# Patient Record
Sex: MALE | Race: WHITE | NOT HISPANIC OR LATINO | Employment: OTHER | URBAN - METROPOLITAN AREA
[De-identification: names, ages, dates, MRNs, and addresses within clinical notes are randomized per-mention and may not be internally consistent; named-entity substitution may affect disease eponyms.]

---

## 2021-07-05 ENCOUNTER — HOSPITAL ENCOUNTER (EMERGENCY)
Facility: CLINIC | Age: 86
Discharge: HOME | End: 2021-07-05
Attending: EMERGENCY MEDICINE | Admitting: EMERGENCY MEDICINE
Payer: MEDICARE

## 2021-07-05 ENCOUNTER — APPOINTMENT (OUTPATIENT)
Dept: GENERAL RADIOLOGY | Facility: CLINIC | Age: 86
End: 2021-07-05
Attending: EMERGENCY MEDICINE
Payer: MEDICARE

## 2021-07-05 VITALS
RESPIRATION RATE: 16 BRPM | TEMPERATURE: 98.5 F | SYSTOLIC BLOOD PRESSURE: 117 MMHG | WEIGHT: 129 LBS | DIASTOLIC BLOOD PRESSURE: 61 MMHG | OXYGEN SATURATION: 98 % | HEART RATE: 61 BPM

## 2021-07-05 DIAGNOSIS — J18.9 COMMUNITY ACQUIRED PNEUMONIA OF LEFT LOWER LOBE OF LUNG: ICD-10-CM

## 2021-07-05 LAB
ANION GAP SERPL CALCULATED.3IONS-SCNC: 3 MMOL/L (ref 3–14)
BASOPHILS # BLD AUTO: 0.1 10E9/L (ref 0–0.2)
BASOPHILS NFR BLD AUTO: 0.4 %
BUN SERPL-MCNC: 23 MG/DL (ref 7–30)
CALCIUM SERPL-MCNC: 8.7 MG/DL (ref 8.5–10.1)
CHLORIDE SERPL-SCNC: 95 MMOL/L (ref 94–109)
CO2 SERPL-SCNC: 34 MMOL/L (ref 20–32)
CREAT SERPL-MCNC: 1.19 MG/DL (ref 0.66–1.25)
DIFFERENTIAL METHOD BLD: ABNORMAL
EOSINOPHIL # BLD AUTO: 0 10E9/L (ref 0–0.7)
EOSINOPHIL NFR BLD AUTO: 0.3 %
ERYTHROCYTE [DISTWIDTH] IN BLOOD BY AUTOMATED COUNT: 16.6 % (ref 10–15)
GFR SERPL CREATININE-BSD FRML MDRD: 55 ML/MIN/{1.73_M2}
GLUCOSE SERPL-MCNC: 84 MG/DL (ref 70–99)
HCT VFR BLD AUTO: 38.3 % (ref 40–53)
HGB BLD-MCNC: 11.9 G/DL (ref 13.3–17.7)
IMM GRANULOCYTES # BLD: 0.1 10E9/L (ref 0–0.4)
IMM GRANULOCYTES NFR BLD: 0.8 %
INR PPP: 2.79 (ref 0.86–1.14)
LABORATORY COMMENT REPORT: NORMAL
LYMPHOCYTES # BLD AUTO: 1.6 10E9/L (ref 0.8–5.3)
LYMPHOCYTES NFR BLD AUTO: 12.1 %
MCH RBC QN AUTO: 27.5 PG (ref 26.5–33)
MCHC RBC AUTO-ENTMCNC: 31.1 G/DL (ref 31.5–36.5)
MCV RBC AUTO: 89 FL (ref 78–100)
MONOCYTES # BLD AUTO: 1.1 10E9/L (ref 0–1.3)
MONOCYTES NFR BLD AUTO: 8.7 %
NEUTROPHILS # BLD AUTO: 10.1 10E9/L (ref 1.6–8.3)
NEUTROPHILS NFR BLD AUTO: 77.7 %
NRBC # BLD AUTO: 0 10*3/UL
NRBC BLD AUTO-RTO: 0 /100
NT-PROBNP SERPL-MCNC: 1407 PG/ML (ref 0–1800)
PLATELET # BLD AUTO: 283 10E9/L (ref 150–450)
POTASSIUM SERPL-SCNC: 3.5 MMOL/L (ref 3.4–5.3)
RBC # BLD AUTO: 4.33 10E12/L (ref 4.4–5.9)
SARS-COV-2 RNA RESP QL NAA+PROBE: NEGATIVE
SODIUM SERPL-SCNC: 131 MMOL/L (ref 133–144)
SPECIMEN SOURCE: NORMAL
TROPONIN I SERPL-MCNC: <0.015 UG/L (ref 0–0.04)
WBC # BLD AUTO: 13 10E9/L (ref 4–11)

## 2021-07-05 PROCEDURE — 99285 EMERGENCY DEPT VISIT HI MDM: CPT | Mod: 25

## 2021-07-05 PROCEDURE — 250N000013 HC RX MED GY IP 250 OP 250 PS 637: Performed by: EMERGENCY MEDICINE

## 2021-07-05 PROCEDURE — 84484 ASSAY OF TROPONIN QUANT: CPT | Performed by: EMERGENCY MEDICINE

## 2021-07-05 PROCEDURE — 85610 PROTHROMBIN TIME: CPT | Performed by: EMERGENCY MEDICINE

## 2021-07-05 PROCEDURE — C9803 HOPD COVID-19 SPEC COLLECT: HCPCS

## 2021-07-05 PROCEDURE — 80048 BASIC METABOLIC PNL TOTAL CA: CPT | Performed by: EMERGENCY MEDICINE

## 2021-07-05 PROCEDURE — 93005 ELECTROCARDIOGRAM TRACING: CPT

## 2021-07-05 PROCEDURE — 71046 X-RAY EXAM CHEST 2 VIEWS: CPT

## 2021-07-05 PROCEDURE — 85025 COMPLETE CBC W/AUTO DIFF WBC: CPT | Performed by: EMERGENCY MEDICINE

## 2021-07-05 PROCEDURE — 87635 SARS-COV-2 COVID-19 AMP PRB: CPT | Performed by: EMERGENCY MEDICINE

## 2021-07-05 PROCEDURE — 83880 ASSAY OF NATRIURETIC PEPTIDE: CPT | Performed by: EMERGENCY MEDICINE

## 2021-07-05 PROCEDURE — 83605 ASSAY OF LACTIC ACID: CPT

## 2021-07-05 PROCEDURE — 82803 BLOOD GASES ANY COMBINATION: CPT

## 2021-07-05 RX ORDER — CEFDINIR 300 MG/1
300 CAPSULE ORAL ONCE
Status: COMPLETED | OUTPATIENT
Start: 2021-07-05 | End: 2021-07-05

## 2021-07-05 RX ORDER — DOXYCYCLINE 100 MG/1
100 CAPSULE ORAL ONCE
Status: COMPLETED | OUTPATIENT
Start: 2021-07-05 | End: 2021-07-05

## 2021-07-05 RX ORDER — DOXYCYCLINE 100 MG/1
100 CAPSULE ORAL 2 TIMES DAILY
Qty: 14 CAPSULE | Refills: 0 | Status: SHIPPED | OUTPATIENT
Start: 2021-07-05 | End: 2021-07-12

## 2021-07-05 RX ORDER — AMOXICILLIN AND CLAVULANATE POTASSIUM 562.5; 437.5; 62.5 MG/1; MG/1; MG/1
2 TABLET, MULTILAYER, EXTENDED RELEASE ORAL 2 TIMES DAILY
Qty: 28 TABLET | Refills: 0 | Status: SHIPPED | OUTPATIENT
Start: 2021-07-05 | End: 2021-07-12

## 2021-07-05 RX ADMIN — DOXYCYCLINE HYCLATE 100 MG: 100 CAPSULE ORAL at 11:31

## 2021-07-05 RX ADMIN — CEFDINIR 300 MG: 300 CAPSULE ORAL at 11:31

## 2021-07-05 ASSESSMENT — ENCOUNTER SYMPTOMS
VOMITING: 0
COUGH: 1
SHORTNESS OF BREATH: 0
FEVER: 0

## 2021-07-05 NOTE — ED PROVIDER NOTES
History   Chief Complaint:  Shortness of Breath       HPI     Jose Juan Holman is a 86 year old male with history of COPD who presents with cough and generalized weakness.  Patient had the gradual onset of generalized weakness which began yesterday associated with a nonproductive cough.  Although he reported shortness of breath to triage nurse, he denies shortness of breath or chest pain to me.  He had a subjective fever but no documented fever at home or in the emergency department.  He is concerned that this is a recurrent bout of pneumonia similar to an episode in which he was admitted at [facility].  He otherwise feels well and denies any novel peripheral edema, hemoptysis, dysuria,, urinary frequency, abdominal pain, diarrhea or any other concerns.    Review of Systems   Constitutional: Negative for fever.   Respiratory: Positive for cough. Negative for shortness of breath.    Cardiovascular: Negative for chest pain.   Gastrointestinal: Negative for vomiting.   Skin: Negative for rash.   All other systems reviewed and are negative.      Allergies:  Sulfa drugs    Medications:  Neurontin  Hydrochlorothiazide  Spiriva  Symbicort  Albuterol   Omeppi  Cordarone  Vitamin D3  Lasix  Zaroxolyn  Coumadin  Home oxygen   Prilosec  AKA Surbex-T  Zocor  Pacerone  Potassium chloride    Past Medical History:    Acute respiratory failure with hypoxia  Acute myocardial infarction  Pleural effusion on left  Cellulitis of right lower extremity  Bilateral leg edema  Generalized muscle weakness  Acute on chronic diastolic heart failure  Long term use of anticoagulants  Persistent atrial fibrillation  NSTEMI  Disease of cardiovascular system  Carotid bruit  Neuralgia  Essential hypertension  GERD without esophagitis  Mixed hyperlipidemia  Prostate cancer   COPD  Pneumonia of right lung due to infectious organism    Past Surgical History:    Prostate cryoablation  Laparotomy  Coronary artery bypass graft    Family History:     Parkinsons  Breast cancer    Social History:  Patient presents with significant other.    Physical Exam     Patient Vitals for the past 24 hrs:   BP Temp Pulse Resp SpO2 Weight   07/05/21 1130 117/61 -- -- 16 98 % --   07/05/21 1115 -- -- -- -- 96 % --   07/05/21 1100 -- -- -- -- 97 % --   07/05/21 1045 -- -- -- -- 98 % --   07/05/21 1030 -- -- -- -- 98 % --   07/05/21 1015 -- -- -- -- 97 % --   07/05/21 1000 -- -- -- -- 95 % --   07/05/21 0945 -- -- -- -- 95 % --   07/05/21 0930 -- -- -- -- 94 % --   07/05/21 0915 117/52 -- -- -- 96 % --   07/05/21 0900 -- -- -- -- 95 % --   07/05/21 0845 -- -- -- -- 94 % --   07/05/21 0830 125/65 -- 61 -- (!) 56 % --   07/05/21 0645 122/84 98.5  F (36.9  C) 71 20 93 % 58.5 kg (129 lb)       Physical Exam    Eyes:    Conjunctiva normal  Neck:    Supple, no meningismus.     CV:     Regular rate and rhythm.      No murmurs, rubs or gallops.       No unilateral leg swelling.       2+ radial pulses bilateral.       1+ bilateral lower extremity edema.  PULM:    Clear to auscultation bilateral.       Diminished breath sounds at left base     No respiratory distress.      No rales or wheezing.     No stridor.  ABD:    Soft, non-tender, non-distended.       No pulsatile masses.       No rebound, guarding or rigidity.  MSK:     No gross deformity to all four extremities.   LYMPH:   No cervical lymphadenopathy.  NEURO:   Alert. Good muscle tone, no atrophy.  Skin:    Warm, dry and intact.    Psych:    Mood is good and affect is appropriate.        Emergency Department Course   ECG  ECG taken at 0832, ECG read at 0839  Sinus brachycardia with 1st degree AV block  Rightward axis  T wave abnormality, consider inferior ischemia  Prolonged QT   Rate 57 bpm. RI interval 210 ms. QRS duration 114 ms. QT/QTc 486/473 ms. P-R-T axes 21 90 77.     Imaging:  XR Chest 2 Views  Small left base pleural effusion. Patchy opacity at the   left lung base with pneumonia being a possibility. Atelectasis is also    possible. Emphysematous changes and bronchiectasis suggested.   Age-indeterminate mid thoracic vertebral body height loss on the   lateral image noted.   Per radiology    Laboratory:  CBC: WBC 13.0 (H), HGB 11.9 (L),   BMP:  (L), Carbon dioxide 34 (H), GFR Estimate 55 (L) o/w WNL (Creatinine 1.19)   Symptomatic COVID: Negative  Troponin (Collected 849): <0.015  INR: 2.79 (H)  BNP: 1,407    Emergency Department Course:    Reviewed:  I reviewed nursing notes, vitals, past medical history and care everywhere    Assessments:  08 I obtained history and examined the patient as noted above.     Interventions:  1131 Omnicef 300 mg PO  1131 Vibramycin 100 mg PO    Disposition:  The patient was discharged to home.       Impression & Plan     Medical Decision Makin-year-old male with history of COPD presents to the ED with nonproductive cough and generalized weakness.  He was primarilyy concerned about recurrent pneumonia.  He has no evidence of bronchospasm on exam.  No evidence of ACS equivalent.  Very low suspicion for pulmonary embolism and is therapeutic on INR at this no indication for CT scan of the chest.  No acute anemia or intravascular volume depletion to account for his symptoms.  COVID-19 has been ruled out.  Chest x-ray reveals a small left pleural effusion with questionable left lower lobe pneumonia.  Radiographic changes are not overwhelming but will error on the side of caution and treat with antibiotics for community-acquired pneumonia.  Augmentin and doxycycline utilized due to concomitant use of warfarin.  Patient to follow-up with primary care physician in the next 2 to 3 days and return to the ED for any worsening symptoms.    Covid-19  Jose Juan Holman was evaluated during a global COVID-19 pandemic, which necessitated consideration that the patient might be at risk for infection with the SARS-CoV-2 virus that causes COVID-19.   Applicable protocols for evaluation were followed  during the patient's care.   COVID-19 was considered as part of the patient's evaluation. The plan for testing is:  a test was obtained during this visit.    Diagnosis:    ICD-10-CM    1. Community acquired pneumonia of left lower lobe of lung  J18.9        Discharge Medications:  Discharge Medication List as of 7/5/2021 11:29 AM      START taking these medications    Details   amoxicillin-clavulanate (AUGMENTIN XR) 1000-62.5 MG 12 hr tablet Take 2 tablets by mouth 2 times daily for 7 days, Disp-28 tablet, R-0, E-Prescribe      doxycycline hyclate (VIBRAMYCIN) 100 MG capsule Take 1 capsule (100 mg) by mouth 2 times daily for 7 days, Disp-14 capsule, R-0, E-Prescribe             Scribe Disclosure:  JESSICA ELKINS MEREDITH, am serving as a scribe at 8:23 AM on 7/5/2021 to document services personally performed by Peter Shields MD based on my observations and the provider's statements to me.            Peter Shields MD  07/05/21 1428

## 2021-07-05 NOTE — ED TRIAGE NOTES
Pt aox4, ABCs intact. Pt c/o cough, SOB and fever since yesterday. Pt states that if feels like his prior episodes of pneumonia.

## 2021-07-06 LAB
CO2 BLDCOV-SCNC: 37 MMOL/L (ref 21–28)
INTERPRETATION ECG - MUSE: NORMAL
LACTATE BLD-SCNC: 1.3 MMOL/L (ref 0.7–2.1)
PCO2 BLDV: 55 MM HG (ref 40–50)
PH BLDV: 7.44 PH (ref 7.32–7.43)
PO2 BLDV: <15 MM HG (ref 25–47)

## 2021-09-17 ENCOUNTER — APPOINTMENT (OUTPATIENT)
Dept: GENERAL RADIOLOGY | Facility: CLINIC | Age: 86
End: 2021-09-17
Attending: EMERGENCY MEDICINE
Payer: MEDICARE

## 2021-09-17 ENCOUNTER — APPOINTMENT (OUTPATIENT)
Dept: CT IMAGING | Facility: CLINIC | Age: 86
End: 2021-09-17
Attending: EMERGENCY MEDICINE
Payer: MEDICARE

## 2021-09-17 ENCOUNTER — HOSPITAL ENCOUNTER (EMERGENCY)
Facility: CLINIC | Age: 86
Discharge: HOME | End: 2021-09-17
Attending: EMERGENCY MEDICINE | Admitting: EMERGENCY MEDICINE
Payer: MEDICARE

## 2021-09-17 VITALS
TEMPERATURE: 97.5 F | OXYGEN SATURATION: 97 % | HEART RATE: 50 BPM | DIASTOLIC BLOOD PRESSURE: 51 MMHG | HEIGHT: 69 IN | SYSTOLIC BLOOD PRESSURE: 134 MMHG | BODY MASS INDEX: 19.4 KG/M2 | RESPIRATION RATE: 16 BRPM | WEIGHT: 131 LBS

## 2021-09-17 DIAGNOSIS — S42.034A CLOSED NONDISPLACED FRACTURE OF ACROMIAL END OF RIGHT CLAVICLE, INITIAL ENCOUNTER: ICD-10-CM

## 2021-09-17 DIAGNOSIS — S00.81XA ABRASION OF FOREHEAD, INITIAL ENCOUNTER: ICD-10-CM

## 2021-09-17 DIAGNOSIS — S51.011A SKIN TEAR OF RIGHT ELBOW WITHOUT COMPLICATION, INITIAL ENCOUNTER: ICD-10-CM

## 2021-09-17 LAB
ANION GAP SERPL CALCULATED.3IONS-SCNC: 5 MMOL/L (ref 3–14)
BASOPHILS # BLD AUTO: 0.1 10E3/UL (ref 0–0.2)
BASOPHILS NFR BLD AUTO: 0 %
BUN SERPL-MCNC: 22 MG/DL (ref 7–30)
CALCIUM SERPL-MCNC: 8.5 MG/DL (ref 8.5–10.1)
CHLORIDE BLD-SCNC: 97 MMOL/L (ref 94–109)
CO2 SERPL-SCNC: 33 MMOL/L (ref 20–32)
CREAT SERPL-MCNC: 1.13 MG/DL (ref 0.66–1.25)
EOSINOPHIL # BLD AUTO: 0.1 10E3/UL (ref 0–0.7)
EOSINOPHIL NFR BLD AUTO: 0 %
ERYTHROCYTE [DISTWIDTH] IN BLOOD BY AUTOMATED COUNT: 16.7 % (ref 10–15)
GFR SERPL CREATININE-BSD FRML MDRD: 59 ML/MIN/1.73M2
GLUCOSE BLD-MCNC: 76 MG/DL (ref 70–99)
HCT VFR BLD AUTO: 36.9 % (ref 40–53)
HGB BLD-MCNC: 11.7 G/DL (ref 13.3–17.7)
IMM GRANULOCYTES # BLD: 0.2 10E3/UL
IMM GRANULOCYTES NFR BLD: 1 %
INR PPP: 2.58 (ref 0.85–1.15)
LYMPHOCYTES # BLD AUTO: 1.5 10E3/UL (ref 0.8–5.3)
LYMPHOCYTES NFR BLD AUTO: 9 %
MCH RBC QN AUTO: 28.3 PG (ref 26.5–33)
MCHC RBC AUTO-ENTMCNC: 31.7 G/DL (ref 31.5–36.5)
MCV RBC AUTO: 89 FL (ref 78–100)
MONOCYTES # BLD AUTO: 0.8 10E3/UL (ref 0–1.3)
MONOCYTES NFR BLD AUTO: 5 %
NEUTROPHILS # BLD AUTO: 13.6 10E3/UL (ref 1.6–8.3)
NEUTROPHILS NFR BLD AUTO: 85 %
NRBC # BLD AUTO: 0 10E3/UL
NRBC BLD AUTO-RTO: 0 /100
PLATELET # BLD AUTO: 308 10E3/UL (ref 150–450)
POTASSIUM BLD-SCNC: 3.1 MMOL/L (ref 3.4–5.3)
RBC # BLD AUTO: 4.14 10E6/UL (ref 4.4–5.9)
SODIUM SERPL-SCNC: 135 MMOL/L (ref 133–144)
WBC # BLD AUTO: 16.2 10E3/UL (ref 4–11)

## 2021-09-17 PROCEDURE — 73030 X-RAY EXAM OF SHOULDER: CPT | Mod: RT

## 2021-09-17 PROCEDURE — 23500 CLTX CLAVICULAR FX W/O MNPJ: CPT | Mod: RT

## 2021-09-17 PROCEDURE — 85025 COMPLETE CBC W/AUTO DIFF WBC: CPT | Performed by: EMERGENCY MEDICINE

## 2021-09-17 PROCEDURE — 36415 COLL VENOUS BLD VENIPUNCTURE: CPT | Performed by: EMERGENCY MEDICINE

## 2021-09-17 PROCEDURE — 80048 BASIC METABOLIC PNL TOTAL CA: CPT | Performed by: EMERGENCY MEDICINE

## 2021-09-17 PROCEDURE — 70450 CT HEAD/BRAIN W/O DYE: CPT

## 2021-09-17 PROCEDURE — 85610 PROTHROMBIN TIME: CPT | Performed by: EMERGENCY MEDICINE

## 2021-09-17 PROCEDURE — 99284 EMERGENCY DEPT VISIT MOD MDM: CPT | Mod: 25

## 2021-09-17 ASSESSMENT — MIFFLIN-ST. JEOR: SCORE: 1269.59

## 2021-09-17 ASSESSMENT — ENCOUNTER SYMPTOMS
WOUND: 1
NECK STIFFNESS: 1
DIZZINESS: 0

## 2021-09-17 NOTE — ED PROVIDER NOTES
History   Chief Complaint:  Fall      HPI   Jose Juan Holman is a 86 year old male, currently on coumadin, with a history of NSTEMI, hypertension, hyperlipidemia, and afib, who presents to the ED for evaluation after a fall. The patient reports he fell around 0630 this morning after tripping over his feet on carpet. He fell forward and hit his head. He now complains of right shoulder pain and slight neck soreness. He does have a visible abrasion on the right side of forehead. Denies loss of consciousness and dizziness.     Review of Systems   Musculoskeletal: Positive for neck stiffness.        (+) shoulder pain   Skin: Positive for wound.   Neurological: Negative for dizziness and syncope.   All other systems reviewed and are negative.    Allergies:  Sulfa drugs     Medications:  Neurontin  Hydrochlorothiazide  Spiriva  Symbicort  Albuterol   Omeppi  Cordarone  Vitamin D3  Lasix  Zaroxolyn  Coumadin  Home oxygen   Prilosec  AKA Surbex-T  Zocor  Pacerone  Potassium chloride     Past Medical History:    Acute respiratory failure with hypoxia  Acute myocardial infarction  Pleural effusion on left  Cellulitis of right lower extremity  Bilateral leg edema  Generalized muscle weakness  Acute on chronic diastolic heart failure  Long term use of anticoagulants  Persistent atrial fibrillation  NSTEMI  Disease of cardiovascular system  Carotid bruit  Neuralgia  Essential hypertension  GERD without esophagitis  Mixed hyperlipidemia  Prostate cancer   COPD  Pneumonia of right lung due to infectious organism     Past Surgical History:    Prostate cryoablation  Laparotomy  Coronary artery bypass graft     Family History:    Parkinsons  Breast cancer    Social History:  Marital Status:   PCP: Stephanie Velasco  Presents to the ED with wife    Physical Exam     Patient Vitals for the past 24 hrs:   BP Temp Temp src Pulse Resp SpO2 Height Weight   09/17/21 1245 134/51 -- -- 50 -- 97 % -- --   09/17/21 1230 137/58 -- -- 56 -- 97  "% -- --   09/17/21 1126 (!) 127/92 97.5  F (36.4  C) Temporal 91 16 92 % 1.753 m (5' 9\") 59.4 kg (131 lb)       Physical Exam  Constitutional: Alert, attentive, GCS 15  HENT:    Nose: Nose normal.    Mouth/Throat: Oropharynx is clear, mucous membranes are moist  Eyes: EOM are normal, anicteric, conjugate gaze  CV: regular rate and rhythm; no murmurs  Chest: Effort normal and breath sounds clear without wheezing or rales, symmetric bilaterally   GI:  non tender. No distension. No guarding or rebound.    MSK: Right 4 cm skin tear on elbow no focal bony tenderness. Anterior right shoulder tenderness without crepitus of the right chest, no AC tenderness, range of shoulder full passively.  Neurological: Alert, attentive, moving all extremities equally.   Skin: Skin is warm and dry.  Neck: No midline tenderness. Full range of motion.  Head: Right frontal forehead skin tear.    Emergency Department Course   Imaging:    Shoulder XR, 3 views, Right:  Acute, minimally displaced distal clavicular fracture with   extension into the AC joint.     Bones are demineralized. Mild glenohumeral joint degenerative change.   No dislocation. Prior sternotomy. Emphysema.     CT Head without contrast:  No acute intracranial abnormality.     Reading per radiology    Laboratory:    CBC: WBC: 16.2 (H), HGB: 11.7 (L), PLT: 308    BMP: Potassium: 3.1 (L), Carbon Dioxide: 33 (H), GFR: 59 (L), o/w WNL (Creatinine: 1.13)    INR: 2.58 (H)     [facility]    -Laceration Repair    Date/Time: 9/17/2021 3:48 PM  Performed by: Reji Beltre MD  Authorized by: Reji Beltre MD     LACERATION DETAILS     Location:  Scalp    Scalp location:  Frontal    Length (cm):  1    REPAIR TYPE:     Repair type:  Simple        SKIN REPAIR     Repair method:  Steri-Strips    Number of Steri-Strips:  1    POST-PROCEDURE DETAILS     Dressing:  Open (no dressing)      PROCEDURE   Patient Tolerance:  Patient tolerated the procedure " well with no immediate complications    [facility]    -Laceration Repair    Date/Time: 9/17/2021 3:49 PM  Performed by: Reji Beltre MD  Authorized by: Reji Beltre MD     LACERATION DETAILS     Location:  Shoulder/arm    Shoulder/arm location:  R elbow (Skin tear)    Length (cm):  4    REPAIR TYPE:     Repair type:  Simple        SKIN REPAIR     Repair method:  Steri-Strips    Number of Steri-Strips:  6    APPROXIMATION     Approximation:  Close  PROCEDURE   Patient Tolerance:  Patient tolerated the procedure well with no immediate complications             Emergency Department Course:    Reviewed:  I reviewed the patient's nursing notes, vitals, past medical records, Care Everywhere.     Assessments:  1138 I obtained history and examined them as noted above.   1239 I rechecked and updated.  1248 Rechecked and explained findings to him.     Disposition:  The patient was discharged to home.     Impression & Plan    Medical Decision Making:   Jose Juan Holman is a 86 year old male on anticoagulation with coumadin who presents after a mechanical fall at home. He did hit his head but did not lose consciousness. He has an abrasion on his forehead. He also complains of right shoulder pain. On exam, he has no neck pain and can range his neck fully and I do not suspect cervical fracture. CT head was negative for intracranial hemorrhage. His right shoulder was positive for distal minimally displaced clavicular fracture. He did have right elbow skin tear and right forehead skin tear that were repaired as above. He was ambulatory here with a steady gait with no complaints of other injury. Wound care was reviewed. Right arm was placed in sling and I recommended an orthopedic follow up. Return precautions/ were reviewed and he was discharged.     Diagnosis:     ICD-10-CM    1. Abrasion of forehead, initial encounter  S00.81XA    2. Skin tear of right elbow without complication, initial  encounter  S51.011A    3. Closed nondisplaced fracture of acromial end of right clavicle, initial encounter  S42.034A      Reji Beltre MD  [employer]  3:51 PM 09/17/21     Scribe Disclosure:  I, Elvi Holland, am serving as a scribe on 9/17/2021 at 11:38 AM to personally document services performed by Reji Beltre MD based on my observations and the provider's statements to me.         Reji Beltre MD  09/17/21 [id number]

## 2021-09-17 NOTE — ED TRIAGE NOTES
Pt presents today after having a fall @ 0630 this morning. Pt tripped on his feet walking on carpet and fell forward. Pt states he is on warfarin. Pt has a visible abrasion on the right side of forehead. Pt also endorses right shoulder pain from the fall. Pt denies LOC and dizziness. ABCs intact. A & O x4.

## 2021-09-17 NOTE — DISCHARGE INSTRUCTIONS
You should make an appointment to follow-up in orthopedic clinic for recheck sometime next week.  You should keep your shoulder in your sling especially when up moving around however when at rest you can take it off and do gentle range of motion exercises likely discussed.  With respect to your skin tears, you should keep them covered, clean and protected, when you showers okay to let water run over them just do not scrub them as this will take the Steri-Strips off.  You should not submerge your cuts in water including pools, lakes, rivers, streams hot tubs or bathtubs.  This is until they are well healed over the next 2 weeks.  Should you have increasing pain, redness or swelling around your skin tears, you should return to emergency department as these can be signs of an infection.